# Patient Record
Sex: MALE
[De-identification: names, ages, dates, MRNs, and addresses within clinical notes are randomized per-mention and may not be internally consistent; named-entity substitution may affect disease eponyms.]

---

## 2018-12-26 ENCOUNTER — HOSPITAL ENCOUNTER (EMERGENCY)
Dept: HOSPITAL 25 - UCEAST | Age: 68
Discharge: HOME | End: 2018-12-26
Payer: MEDICARE

## 2018-12-26 DIAGNOSIS — B34.9: Primary | ICD-10-CM

## 2018-12-26 DIAGNOSIS — Z87.891: ICD-10-CM

## 2018-12-26 PROCEDURE — G0463 HOSPITAL OUTPT CLINIC VISIT: HCPCS

## 2018-12-26 PROCEDURE — 99201: CPT

## 2018-12-26 PROCEDURE — 87651 STREP A DNA AMP PROBE: CPT

## 2018-12-26 NOTE — UC
Throat Pain/Nasal Kang HPI





- HPI Summary


HPI Summary: 





69 yo male presents with a sore throat and sinus "burning" that began last 

night. He feels better today. Did a warm salt water gargle last night and felt 

better. Has not taken anything OTC. Denies fever, chills, cough.








- History of Current Complaint


Chief Complaint: UCGeneralIllness


Stated Complaint: SORE THROAT SINUS ISSUE


Time Seen by Provider: 12/26/18 13:01


Hx Obtained From: Patient


Onset/Duration: Sudden Onset


Severity: Moderate


Pain Intensity: 6


Pain Scale Used: 0-10 Numeric





- Allergies/Home Medications


Allergies/Adverse Reactions: 


 Allergies











Allergy/AdvReac Type Severity Reaction Status Date / Time


 


No Known Allergies Allergy   Verified 12/26/18 12:48











Home Medications: 


 Home Medications





NK [No Home Medications Reported]  12/26/18 [History Confirmed 12/26/18]











PMH/Surg Hx/FS Hx/Imm Hx





- Additional Past Medical History


Additional PMH: 





None





- Surgical History


Surgical History: Yes


Surgery Procedure, Year, and Place: throat/bone removed infected 1970





- Family History


Known Family History: Positive: Unknown





- Social History


Occupation: Retired


Lives: With Family


Alcohol Use: None


Substance Use Type: None


Smoking Status (MU): Former Smoker


Length of Time of Smoking/Using Tobacco: 20


Have You Smoked in the Last Year: No


When Did the Patient Quit Smoking/Using Tobacco: 1991





Review of Systems


All Other Systems Reviewed And Are Negative: Yes


Constitutional: Positive: Negative


Skin: Positive: Negative


Eyes: Positive: Negative


ENT: Positive: Sore Throat, Sinus Pain/Tenderness


Respiratory: Positive: Negative


Cardiovascular: Positive: Negative


Neurological: Positive: Negative


Psychological: Positive: Negative





Physical Exam





- Summary


Physical Exam Summary: 





GENERAL: NAD. WDWN. No pain distress.


SKIN: No rashes, sores, lesions, or open wounds.


HEENT:


            Head: AT/NC


            Eyes:  EOM intact. Conjunctiva clear without inflammation or 

discharge.


            Ears: Hearing grossly normal. TMs intact, no bulging, erythema, or 

edema. 


            Nose: Nasal mucosa pink and moist. NTTP maxillary and frontal 

sinus. 


            Throat: Posterior oropharynx without exudates, erythema, or 

tonsillar enlargement.  Uvula midline.


NECK: Supple. Nontender. No lymphadenopathy. 


CHEST:  CTAB. No r/r/w. No accessory muscle use. Breathing comfortably and in 

no distress.


CV:  RRR. Without m/r/g. Pulses intact. Cap refill <2seconds


NEURO: Alert. 


PSYCH: Age appropriate behavior.





Triage Information Reviewed: Yes


Vital Signs: 


 Initial Vital Signs











Temp  97.4 F   12/26/18 12:40


 


Pulse  74   12/26/18 12:40


 


Resp  18   12/26/18 12:40


 


BP  135/78   12/26/18 12:40


 


Pulse Ox  99   12/26/18 12:40








 Laboratory Tests











  12/26/18





  13:00


 


Group A Strep Rapid  Negative











Vital Signs Reviewed: Yes





Throat Pain/Nasal Course/Dx





- Course


Course Of Treatment: Suspect viral illness. Advised to try tylenol and flonase 

OTC. F/u if symptoms worsen.





- Differential Dx/Diagnosis


Provider Diagnosis: 


 Viral syndrome








Discharge





- Sign-Out/Discharge


Documenting (check all that apply): Patient Departure


All imaging exams completed and their final reports reviewed: No Studies





- Discharge Plan


Condition: Stable


Disposition: HOME


Patient Education Materials:  Viral Syndrome (ED)


Referrals: 


Magdy Martinez MD [Primary Care Provider] - 


Additional Instructions: 


If you develop a fever, shortness of breath, chest pain, new or worsening 

symptoms - please call your PCP or go to the ED.


 


Please try a humidifier at bedtime and daily Flonase over the counter to help 

your sinus symptoms











- Billing Disposition and Condition


Condition: STABLE


Disposition: Home

## 2018-12-31 ENCOUNTER — HOSPITAL ENCOUNTER (EMERGENCY)
Dept: HOSPITAL 25 - UCEAST | Age: 68
Discharge: HOME | End: 2018-12-31
Payer: MEDICARE

## 2018-12-31 VITALS — SYSTOLIC BLOOD PRESSURE: 127 MMHG | DIASTOLIC BLOOD PRESSURE: 71 MMHG

## 2018-12-31 DIAGNOSIS — Z87.891: ICD-10-CM

## 2018-12-31 DIAGNOSIS — H10.9: Primary | ICD-10-CM

## 2018-12-31 PROCEDURE — G0463 HOSPITAL OUTPT CLINIC VISIT: HCPCS

## 2018-12-31 PROCEDURE — 99212 OFFICE O/P EST SF 10 MIN: CPT

## 2018-12-31 NOTE — UC
Eye Complaint HPI





- HPI Summary


HPI Summary: 





69 yo male presents with b/l eye redness and drainage. He tells me that last 

week he developed some URI symptoms, which have improve. His eye redness, 

however, has gotten worse. Started in his left eye with some redness and clear 

drainage. Has progressed to both eyes and now has yellow crusting. Waking up 

"crusted shut". Denies fever, chills, vision changes, sore throat, cough.





- History of Current Complaint


Chief Complaint: UCEye


Stated Complaint: EYE DRAINAGE


Time Seen by Provider: 12/31/18 13:11


Hx Obtained From: Patient


Severity Currently: None


Pain Intensity: 0





- Allergies/Home Medications


Allergies/Adverse Reactions: 


 Allergies











Allergy/AdvReac Type Severity Reaction Status Date / Time


 


No Known Allergies Allergy   Verified 12/31/18 12:56











Home Medications: 


 Home Medications





Multivitamin [Multivitamins] 1 cap PO DAILY 12/31/18 [History Confirmed 12/31/18

]











PMH/Surg Hx/FS Hx/Imm Hx





- Additional Past Medical History


Additional PMH: 





None





- Surgical History


Surgical History: Yes


Surgery Procedure, Year, and Place: throat/bone removed infected 1970





- Family History


Known Family History: Positive: Unknown





- Social History


Lives: With Family


Alcohol Use: None


Substance Use Type: None


Smoking Status (MU): Former Smoker


Length of Time of Smoking/Using Tobacco: 20


Have You Smoked in the Last Year: No


When Did the Patient Quit Smoking/Using Tobacco: 1991





Review of Systems


All Other Systems Reviewed And Are Negative: Yes


Constitutional: Positive: Negative


Skin: Positive: Negative


Eyes: Positive: Drainage, Eye Redness


ENT: Positive: Negative


Respiratory: Positive: Negative


Cardiovascular: Positive: Negative


Gastrointestinal: Positive: Negative


Neurovascular: Positive: Negative


Neurological: Positive: Negative


Psychological: Positive: Negative





Physical Exam





- Summary


Physical Exam Summary: 





GENERAL: WDWN. No pain distress.


SKIN: No rashes, sores, lesions, or open wounds.


HEENT:


            Head: AT/NC


            Eyes: EOM intact. PERRLA. B/L EYEs: Mild scleral injection. 

Conjunctiva with mild erythema and inflammation. Mild clear discharge. No FBs 

appreciated


            Nose: NTTP maxillary and frontal sinus. 


NECK: Supple. Nontender. No lymphadenopathy. 


CHEST:  No accessory muscle use. Breathing comfortably and in no distress.


CV:  Pulses intact. Cap refill <2seconds


NEURO: Alert.


PSYCH: Age appropriate behavior.





Triage Information Reviewed: Yes


Vital Signs: 


 Initial Vital Signs











Temp  97.2 F   12/31/18 12:53


 


Pulse  52   12/31/18 12:53


 


Resp  18   12/31/18 12:53


 


BP  127/71   12/31/18 12:53


 


Pulse Ox  100   12/31/18 12:53











Vital Signs Reviewed: Yes





Eye Complaint Course/Dx





- Course


Course Of Treatment: Conjunctivitis





- Differential Dx/Diagnosis


Provider Diagnosis: 


 Conjunctivitis








Discharge





- Sign-Out/Discharge


Documenting (check all that apply): Patient Departure


All imaging exams completed and their final reports reviewed: No Studies





- Discharge Plan


Condition: Stable


Disposition: HOME


Prescriptions: 


Polymyx/Trimethoprim OPTH* [Polytrim OPHTH*] 1 drop BOTH EYES TID #1 btl


Patient Education Materials:  Conjunctivitis (ED)


Referrals: 


Magdy Martinez MD [Primary Care Provider] - 


Additional Instructions: 


If you develop a fever, shortness of breath, chest pain, new or worsening 

symptoms - please call your PCP or go to the ED.


 








- Billing Disposition and Condition


Condition: STABLE


Disposition: Home